# Patient Record
(demographics unavailable — no encounter records)

---

## 2025-02-24 NOTE — HISTORY OF PRESENT ILLNESS
[Never] : never [TextBox_4] :    64 M who came to the ED for slurred speech. History obtained via patient's wife. H/O gerd and hld  Patient saw ENT a few days ago for difficulty swallowing and was prescribed omeprazole.

## 2025-03-04 NOTE — HISTORY OF PRESENT ILLNESS
[de-identified] :  JAYLEN GANN is a 64 year old man who presents to the [facility] with difficulty swallowing for the past ~  ~.   He is referred by ~  ~. They ~  ~ note altering their diet to avoid troublesome consistencies. They ~  ~ note regurgitation of recently eaten foods. They ~  ~ note recent weight loss. They ~  ~ note frequent classic reflux symptoms.

## 2025-03-05 NOTE — ADDENDUM
[FreeTextEntry1] :  Documented by Amaris Heart acting as scribe for Dr. Goode on 03/05/2025. All Medical record entries made by the Scribe were at my, Dr. Goode, direction and personally dictated by me on 03/05/2025 . I have reviewed the chart and agree that the record accurately reflects my personal performance of the history, physical exam, assessment and plan. I have also personally directed, reviewed, and agreed with the discharge instructions.

## 2025-03-05 NOTE — PHYSICAL EXAM
[Midline] : trachea located in midline position [Normal] : no rashes [de-identified] : thrush observed on the tongue [de-identified] :  uvula and palate rise in the middle.  [de-identified] :  uvula and palate rise in the middle.

## 2025-03-05 NOTE — HISTORY OF PRESENT ILLNESS
[de-identified] : 64yM with dysphagia for about a month Previously saw ENT, has right DS, prescribed Omeprazole 40 mg, some improvement with after starting omeprazole.  On Pepcid 10 mg at bedtime.   No nasal spray Wife describes voice change with hyponasality change that suddenly comes and goes Choking with food, occasionally with water.  Saw gastro for dysphagia Scheduled for swallow eval with kai 3/6/24.  Has a swallow test scheduled for Friday.  Was recently at [facility] for slurred speech, CT of the head was done to r/o stroke, scan is normal, ED recommented to see neuro.  Now dysphonic, worse at the end of the day. Pt describes voice becomes increasingly nasal through the day. Denies SOB, hemoptysis Globus sensation, phlegm   CT neck done 2/28/25. Impression: Mild asymmetric enlargement of the right laryngeal ventricle and piriform sinus suggestive of right vocal fold dysfunction. Correlate with direct visualization.  Nonspecific soft tissue nodule within the right tracheoesophageal groove which may represent a paratracheal lymph node versus exophytic thyroid nodule versus parathyroid adenoma.  No nodular or masslike enhancement in the aerodigestive tract.  Some nasal congestion, No epistaxis  Patient denies otalgia, otorrhea, ear infections, tinnitus, dizziness, vertigo, headaches related to hearing.  Wife state hearing loss.  Recently prescribed Plavix

## 2025-03-06 NOTE — ASSESSMENT
[FreeTextEntry1] : CLINICAL DYSPHAGIA EVALUATION   Date of Report: 03/06/2025  Date of Evaluation:  03/06/2025  Patient Name: Osmani Chicas   YOB: 1960  Primary Diagnosis:  Dysphagia, Dysarthria  Treatment Diagnosis:  Dysphagia, Dysarthria, Dysphonia   Referring Physician:   Dr. Hernandez   REASON FOR REFERRAL: Osmani Chicas is an 64-year-old male who was referred for a clinical swallow evaluation by his pulmonologist Dr. Hernandez due to concerns for dysphagia.    PRESENTING PROBLEM. Mr. Chicas arrived to the evaluation accompanied by his spouse, Madisyn, and was received alert, cooperative, and in no apparent distress.  Per chart review and patient/spouse report, the patient presented with new onset of slurred speech and concerns for dysphagia and was hospitalized at [facility] February 2025 with findings of TIA. The patient is now following with pulmonology, neurology, and otolaryngology due to ongoing concerns for speech and swallowing. The patient is planned for ongoing neurological work up at this time. The patient was most recently seen by otolaryngologist Dr. Goode on 3/5/25 with findings of a R VF paresis, thrush, and recommendations for a Modified Barium Swallow Study (not yet scheduled) and further work up.  The patient and patient's spouse reports speech difficulty marked by reduced speech intelligibility, low vocal volume, and a "nasal" quality. The patient reports swallowing difficulty marked by intermittent feelings of stasis with solids and liquids (greater with dry/denser solids) with subsequent coughing and volitional regurgitation with noted nasal emission. The patient further reports concerns for mucous build up and "throat irritation" resulting in throat clearing/coughing. The patient denies needing the Heimlich maneuver. The patient denies odynophagia, feelings of shortness of breath during or after meals, and any recent pneumonia. The patient endorses a 10 pound weight loss in the last month.       Medical History, per EMR:    Active Problems Chronic cough (786.2) (R05.3) Oropharyngeal dysphagia (787.22) (R13.12) Rhinolalia (784.49) (R49.8)       CLINICAL FINDINGS    Oral Peripheral Assessment:   Structures: WFL    Symmetry:   WFL                    Dentition:  Complete     Soft Palate:   WFL    Secretions:  The patient managed his secretions without difficulty and his oral cavity was adequately hydrated.   Cognition/Communication:  The patient was oriented x3 and demonstrated ability to follow simple commands and verbally express his wants/needs at the conversational level without word retrieval deficits. The patient presented with a mild dysarthria of speech marked by slower rate, reduced vocal volume, and mildly reduced articulatory precision at the conversational level.    Voice: Perceptually, the patient's voice presented as mildly hoarse, with hypernasality with low vocal volume.    Functions: Assessment of labial-lingual and mandibular musculature revealed adequate labial, lingual, and mandibular ROM and strength.    Volitional Swallow: Upon clinician verbal instruction, the patient demonstrated timely pharyngeal swallow trigger and complete hyolaryngeal elevation.     Consistencies Administered:     Solids: Soft and bite sized, minced and moist and pureed    Liquids: Moderately thick, Mildly thick and thin liquids     Oral Stage:     The patient demonstrated a functional oral phase for pureed, solids, moderately thick, mildly thick, and thin liquids marked by adequate oral acceptance and timely collection and transport. Piecemeal deglutition observed throughout trials.    Pharyngeal Stage:   The patient demonstrated a pharyngeal dysphagia with pureed, solids, moderately thick, mildly thick and thin liquids characterized by suspected timely pharyngeal swallow trigger and laryngeal elevation/excursion noted by digital palpation with multiple swallows and patient with reports of stasis (greater with soft and bite sized solids vs. Minced and moist, pureed, and liquids). Patient presented with volitional throat clearing across trials reporting feelings of stasis and "irritation" in throat. Patient was cued to perform subsequent swallows and to alternate solids with liquids which reduced feelings of stasis and throat clearing. Coughing was observed with thin liquids despite use of strategies.    IMPRESSIONS:   1. Pharyngeal dysphagia  2. Mild Dysarthria   3. Dysphonia    PROGNOSIS: Fair-Good with therapeutic intervention and adherence to HEP   RECOMMENDATIONS:  1. The patient was advised to consume a diet level of minced and moist solids and mildly thick liquids, perform two swallows per bolus, and alternate solids with liquids.  2.  The patient was advised to consume small bites/sips, perform two swallows, and alternate solids with liquids at a decreased rate of consumption.   3. The patient was advised to position himself upright (no <90 degrees) during all meals and for at least 1 hour after while maintaining reflux precautions.   4. An objective swallow study of a Modified Barium Swallow Study (MBSS) is recommended to further assess swallow mechanism and safest diet level.   5. Initiate dysphagia therapy (CPT code 08239) and speech/voice therapy (CPT code 14960) to maximize swallow function and overall speech intelligibility/vocal function.    6. Follow up with referring/following physicians, as directed   EDUCATION:  All evaluation results and recommendations were discussed with patient and his spouse. Verbal educational information and instruction was provided to the patient and patient's family in regards to optimal eating strategies (i.e. small bites/sips and alternating solids with liquids at decreased rate of consumption) at the end of the evaluation session.         THERAPY GOALS:  1.  The patient will reduce vocal tension/strain by decreasing upper musculoskeletal tension via relaxation exercises and laryngeal massage technique with 100% accuracy independently.  2. The patient will apply learned techniques (i.e. easy onset, frontal focus) yielding healthy vocal cord adduction and improved vocal quality in structured tasks with 80% accuracy with minimal verbal cues.  3. The patient will engage in laryngeal adduction exercises to target vocal fold closure and mobility with 90% accuracy given min cues.      4. The patient will utilize dysarthria compensatory strategies such as increased vocal volume and over articulation with 80% success given minimal cues to improve overall vocal output.  5. The patient will engage in therapeutic swallowing strengthening exercises (i.e. effortful swallow) to maximize swallow function        Please contact this Center at [phone] if additional information is needed.    Gabriela Wooten M.A. CCC-SLP  Speech-Language Pathologist   [facility] [place]

## 2025-03-13 NOTE — DISCUSSION/SUMMARY
[FreeTextEntry1] : 65 yo man with 1 month of dysphagia and dysarthria. CT/CTA in ED had been normal, plavix and ASA was recommended. Dysarthria may be worse in evening. Remainder of exam unremarkable.   Will check MG labs, and MRI brain, MRI neck per ENT  Further workup pending those studies.

## 2025-03-13 NOTE — DISCUSSION/SUMMARY
[FreeTextEntry1] : 63 yo man with 1 month of dysphagia and dysarthria. CT/CTA in ED had been normal, plavix and ASA was recommended. Dysarthria may be worse in evening. Remainder of exam unremarkable.   Will check MG labs, and MRI brain, MRI neck per ENT  Further workup pending those studies.

## 2025-03-13 NOTE — PHYSICAL EXAM
[Person] : oriented to person [Place] : oriented to place [Time] : oriented to time [Short Term Intact] : short term memory intact [Registration Intact] : recent registration memory intact [Naming Objects] : no difficulty naming common objects [Fluency] : fluency intact [Cranial Nerves Optic (II)] : visual acuity intact bilaterally,  visual fields full to confrontation, pupils equal round and reactive to light [Cranial Nerves Oculomotor (III)] : extraocular motion intact [Cranial Nerves Trigeminal (V)] : facial sensation intact symmetrically [Cranial Nerves Facial (VII)] : face symmetrical [Cranial Nerves Vestibulocochlear (VIII)] : hearing was intact bilaterally [Cranial Nerves Glossopharyngeal (IX)] : tongue and palate midline [Cranial Nerves Accessory (XI - Cranial And Spinal)] : head turning and shoulder shrug symmetric [Cranial Nerves Hypoglossal (XII)] : there was no tongue deviation with protrusion [Motor Tone] : muscle tone was normal in all four extremities [Motor Strength] : muscle strength was normal in all four extremities [Motor Handedness Right-Handed] : the patient is right hand dominant [Sensation Tactile Decrease] : light touch was intact [Sensation Vibration Decrease] : vibration was intact [2+] : Biceps left 2+ [1+] : Patella left 1+ [de-identified] : thrush observed on the tongue [de-identified] :  uvula and palate rise in the middle.  [Dysdiadochokinesia Bilaterally] : not present [Coordination - Dysmetria Impaired Finger-to-Nose Bilateral] : not present [FreeTextEntry6] : Neck flexion/extension 5/5 [FreeTextEntry1] : Speech mildly dysarthric, mostly lingual. EOMI, no SWJ, sustained upgaze ok. Tone normal, no bradykinesia. Gait normal, romberg negative.

## 2025-03-13 NOTE — PHYSICAL EXAM
[Person] : oriented to person [Place] : oriented to place [Time] : oriented to time [Short Term Intact] : short term memory intact [Registration Intact] : recent registration memory intact [Naming Objects] : no difficulty naming common objects [Fluency] : fluency intact [Cranial Nerves Optic (II)] : visual acuity intact bilaterally,  visual fields full to confrontation, pupils equal round and reactive to light [Cranial Nerves Oculomotor (III)] : extraocular motion intact [Cranial Nerves Trigeminal (V)] : facial sensation intact symmetrically [Cranial Nerves Facial (VII)] : face symmetrical [Cranial Nerves Vestibulocochlear (VIII)] : hearing was intact bilaterally [Cranial Nerves Glossopharyngeal (IX)] : tongue and palate midline [Cranial Nerves Accessory (XI - Cranial And Spinal)] : head turning and shoulder shrug symmetric [Cranial Nerves Hypoglossal (XII)] : there was no tongue deviation with protrusion [Motor Tone] : muscle tone was normal in all four extremities [Motor Strength] : muscle strength was normal in all four extremities [Motor Handedness Right-Handed] : the patient is right hand dominant [Sensation Tactile Decrease] : light touch was intact [Sensation Vibration Decrease] : vibration was intact [2+] : Biceps left 2+ [1+] : Patella left 1+ [de-identified] : thrush observed on the tongue [de-identified] :  uvula and palate rise in the middle.  [Dysdiadochokinesia Bilaterally] : not present [Coordination - Dysmetria Impaired Finger-to-Nose Bilateral] : not present [FreeTextEntry6] : Neck flexion/extension 5/5 [FreeTextEntry1] : Speech mildly dysarthric, mostly lingual. EOMI, no SWJ, sustained upgaze ok. Tone normal, no bradykinesia. Gait normal, romberg negative.

## 2025-03-13 NOTE — HISTORY OF PRESENT ILLNESS
[FreeTextEntry1] : The patient is a 63 yo RHM with dysphagia (gets stuck in esophagus) and slurred speech. Sudden onset in February 2025. Went to ED after episode of being unable to speak, CT/A head normal. Saw ENT, who considered MRI brain and neck, but has not ordered. Omeprazole 40 mg, some improvement with after starting omeprazole. On Pepcid 10 mg at bedtime. Scheduled for swallow eval with kai 3/6/24. Now dysphonic, worse at the end of the day. Patient's wife describes voice becomes increasingly nasal through the day.  No diplopia in the evening. No other weakness, numbness of lip initially. Handwriting unchanged. ADLs ok. Balance and walking ok.

## 2025-03-25 NOTE — PHYSICAL EXAM
[Person] : oriented to person [Place] : oriented to place [Time] : oriented to time [Fluency] : fluency intact [Cranial Nerves Optic (II)] : visual acuity intact bilaterally,  visual fields full to confrontation, pupils equal round and reactive to light [Cranial Nerves Oculomotor (III)] : extraocular motion intact [Cranial Nerves Trigeminal (V)] : facial sensation intact symmetrically [Cranial Nerves Vestibulocochlear (VIII)] : hearing was intact bilaterally [Cranial Nerves Glossopharyngeal (IX)] : tongue and palate midline [Cranial Nerves Accessory (XI - Cranial And Spinal)] : head turning and shoulder shrug symmetric [Cranial Nerves Hypoglossal (XII)] : there was no tongue deviation with protrusion [Motor Tone] : muscle tone was normal in all four extremities [No Muscle Atrophy] : normal bulk in all four extremities [+4] : hip flexion +4/5 [5] : ankle plantar flexion 5/5 [Sensation Tactile Decrease] : light touch was intact [Abnormal Walk] : normal gait [1+] : Ankle jerk left 1+

## 2025-03-25 NOTE — HISTORY OF PRESENT ILLNESS
[FreeTextEntry1] : This is a 64M who comes in with AChR positive generalized MG.  In February, the patients started getting tongue weakness/slurred speech. His wife worried about a stroke, brought him to ED, and had negative stroke workup but started on Plavix empirically. They followed up with ENT (Dr. 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 They were recommended to start PT, MRI Brain/neck, and neurology follow up. They then saw Dr. Sesay who ordered MG labs and positive AChR binding/blocking/modulating as well as anti-Titin. CT Chest negative for thymoma.  He continues to have worsening dysphagia, dysarthria, generalized weakness, and fatigue. Denies diplopia or blurry vision. His wife has been blending his food as needs modified diet, thought he is still able to take pills. He has lost over 15 lbs in over two months. Having weakness throughout - chewing is difficult, weakness throughout. At baseline was walking 7 miles daily, now barely walks. Fatigues very quickly. Morning he tends to be better, sounds better, eats better - all worsens as the day progresses.

## 2025-03-25 NOTE — ASSESSMENT
[FreeTextEntry1] : This is a 64M with generalized AChR positive, generalized MG with significant bulbar symptoms. Discussed condition, goals of treatment, and prognosis at length.  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

## 2025-03-28 NOTE — ASSESSMENT
[FreeTextEntry1] : MODIFIED BARIUM SWALLOW STUDY   Date of Report: 3/28/25  Date of Evaluation: 3/28/25  Patient Name: Osmani Chicas  YOB: 1960  Primary Diagnosis: OroPharyngeal Dysphagia  Treatment Diagnosis:  OroPharyngeal Dysphagia  Referring Physician: Dr. Goode   Impressions/Results: No penetration or aspiration before, during, or after the swallow on puree, regular, mildly thick or thin liquids. 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   Reason For Referral: This 64 year old male was seen for a Modified Barium Swallow to objectively assess swallow function, rule out aspiration, and assess for safest diet consistency. Patient confirms medical history per EMR, states a work-up is in progress for Mysathenia Gravis however cannot recall any definitive diagnosis or results from recent esophagram. Patient reports recent history of progressive dysarthria and dysphagia as well as hyponasal voice which was judged to be mild during today's visit. Patient with fluent speech in English and declined need for Kyrgyz translation and completed interview and exam in English. Patient reports up until 3 days ago, he was "having trouble swallowing and relied more on liquids." Patient states "no it is much much, much better." Patient notes recently starting "few medications to help with swallowing" however cannot recall names of medication except for prednisone. He reports "30 minutes after taking the pill, everything is much better."   Current Diet: Regular & Thin per patient report    Medical History: Patient recently seen by Neurologist: "Assessment  Myasthenia (358.00) (G70.00) This is a 64M with generalized AChR positive, generalized MG with significant bulbar symptoms. Discussed condition, goals of treatment, and prognosis at length.  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  The patient was alert, cooperative.  Secretion management was adequate.  There was no coughing, throat clearing or wet/gurgly vocal quality prior to test administration.   Consistencies Administered:    Solids:  Puree, Regular  Liquids:  Mildly Thick, Thin   Summary & Impression  1. Functional oral phase for puree and regular characterized by adequate bolus retrieval/containment, adequate mastication of regular, adequate anterior posterior transfer, piecemeal deglutition, and oral clearance.  2. Mild oral dysphagia for mildly thick and thin liquids marked by adequate retrieval, reduced containment with premature spillage to the hypopharynx due to reduced tongue to palate seal, piecemeal deglutition, and adequate oral clearance. 3. Mild pharyngeal dysphagia marked by prompt swallow initiation, reduced laryngeal elevation, adequate base of tongue retraction, adequate epiglottic deflection, complete laryngeal vestibule closure, and adequate pharyngeal constriction. There were mild pharyngeal clearance deficits for puree and regular solid located at the vallecula/pyriform sinuses which cleared upon spontaneous swallows and use of a liquid wash. There was trace residue located at the vallecula/pyriform sinuses for mildly thick and thin liquids which cleared upon spontaneous reswallow. No penetration or aspiration before, during, or after the swallow on puree, regular, mildly thick or thin liquids. 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   An Esophageal Screen was completed. The patient was turned to AP view and given a puree bolus, a thin bolus, and a barium tablet with water which all coursed through the hypopharynx/esophagus. Of note; this is not a completed assessment of the esophagus.    Aspiration - Penetration Scale:   PUREE: 1  SOLIDS: 1  MODERATELY THICK LIQUIDS: N/A  MILDLY THICK LIQUIDS: 1  THIN LIQUIDS: 1   Aspiration - Penetration Scale  (Vince et al Dysphagia 11:93-98 (April 1996), Aspiration-Penetration Scale)  1.    Material does not enter the airway  2.    Material enters the airway, remains above the vocal folds, and is ejected from the airway  3.    Material enters the airway, remains above the vocal folds, and is not ejected  4.    Material enters the airway, contacts the vocal folds, and is ejected from the airway  5.    Material enters the airway, contacts the vocal folds, and is not ejected from the airway  6.    Material enters the airway, passes below the vocal folds and is ejected into the larynx or out of the airway  7.    Material enters the airway, passes below the vocal folds, and is not ejected from the trachea despite effort  8.    Material enters the airway, passes below the vocal folds, and no effort is made to eject   Recommendations:  1.) Regular / Thin Liquids  2.) Alternate Solids and Liquids  3.) Aspiration/Reflux Precautions  4.) Follow up with referring physician  5.) Continue swallowing therapy to improve swallow mechanism   The above results and recommendations have been discussed with the patient. All questions were answered with patient demonstrating good understanding.    Should you have any additional concerns, please contact the Center at [phone].   Yary Roy MS, CCC-SLP  Speech-Language Pathologist   [facility]

## 2025-04-11 NOTE — HISTORY OF PRESENT ILLNESS
[FreeTextEntry1] : Reports that he has been progressively feeling better. He was waking up with significant dysphagia, dysphonia, and fatigue but over the last couple of days this has improved, though he continues to have some morning symptoms. Has noticed the pyridostigmine takes about an hour to kick in and wears off by 1-2 hours. Not sure if it's giving him much benefit, especially more recently as his baseline symptoms are improving.    Having urinary frequency since starting prednisone.  Patient's wife and son with questions regarding supplements.  Has not pursued vaccination yet.  Initial HPI This is a 64M who comes in with AChR positive generalized MG.  In February, the patients started getting tongue weakness/slurred speech. His wife worried about a stroke, brought him to ED, and had negative stroke workup but started on Plavix empirically. They followed up with ENT (Dr. 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 They were recommended to start PT, MRI Brain/neck, and neurology follow up. They then saw Dr. Sesay who ordered MG labs and positive AChR binding/blocking/modulating as well as anti-Titin. CT Chest negative for thymoma.  He continues to have worsening dysphagia, dysarthria, generalized weakness, and fatigue. Denies diplopia or blurry vision. His wife has been blending his food as needs modified diet, thought he is still able to take pills. He has lost over 15 lbs in over two months. Having weakness throughout - chewing is difficult, weakness throughout. At baseline was walking 7 miles daily, now barely walks. Fatigues very quickly. Morning he tends to be better, sounds better, eats better - all worsens as the day progresses.

## 2025-04-11 NOTE — ASSESSMENT
[FreeTextEntry1] : This is a 64M with generalized AChR positive, generalized MG with significant bulbar symptoms. He has had good response to steroids, mycophenolate, and pyridostigmine. Feels that pyridostigmine is not as effective and he is having increased urinary frequency on steroids.  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

## 2025-05-02 NOTE — PHYSICAL EXAM
[Midline] : trachea located in midline position [Normal] : no rashes [de-identified] : thrush observed on the tongue [de-identified] :  uvula and palate rise in the middle.  [de-identified] :  uvula and palate rise in the middle.

## 2025-05-02 NOTE — REASON FOR VISIT
[Subsequent Evaluation] : a subsequent evaluation for [Spouse] : spouse [FreeTextEntry2] : difficulty swallowing

## 2025-05-02 NOTE — HISTORY OF PRESENT ILLNESS
[de-identified] : 64yM with dysphagia for about a month Previously saw ENT, has right DS, prescribed Omeprazole 40 mg, some improvement with after starting omeprazole.  On Pepcid 10 mg at bedtime.   No nasal spray Wife describes voice change with hyponasality change that suddenly comes and goes Choking with food, occasionally with water.  Saw gastro for dysphagia Was recently at [facility] for slurred speech, CT of the head was done to r/o stroke, scan is normal, ED recommented to see neuro.  Now dysphonic, worse at the end of the day. Pt describes voice becomes increasingly nasal through the day. Denies SOB, hemoptysis Here for follow up. Doing well, improvement in dysphagia and dysphonia.  MBS 3/28/25 showing no aspiration. Working with swallow and voice therapy with huge improvement Saw neuro who decreased his prednisone to 15mg No longer taking famotidine. Finished course of nystatin  CT neck done 2/28/25. Impression: Mild asymmetric enlargement of the right laryngeal ventricle and piriform sinus suggestive of right vocal fold dysfunction. Correlate with direct visualization.  Nonspecific soft tissue nodule within the right tracheoesophageal groove which may represent a paratracheal lymph node versus exophytic thyroid nodule versus parathyroid adenoma.  No nodular or masslike enhancement in the aerodigestive tract.  Some nasal congestion, No epistaxis  Patient denies otalgia, otorrhea, ear infections, tinnitus, dizziness, vertigo, headaches related to hearing.  Wife state hearing loss.  Recently prescribed Plavix

## 2025-05-15 NOTE — PHYSICAL EXAM
[Person] : oriented to person [Place] : oriented to place [Time] : oriented to time [Fluency] : fluency intact [Cranial Nerves Optic (II)] : visual acuity intact bilaterally,  visual fields full to confrontation, pupils equal round and reactive to light [Cranial Nerves Oculomotor (III)] : extraocular motion intact [Cranial Nerves Trigeminal (V)] : facial sensation intact symmetrically [Cranial Nerves Facial (VII)] : face symmetrical [Cranial Nerves Vestibulocochlear (VIII)] : hearing was intact bilaterally [Cranial Nerves Glossopharyngeal (IX)] : tongue and palate midline [Cranial Nerves Accessory (XI - Cranial And Spinal)] : head turning and shoulder shrug symmetric [Cranial Nerves Hypoglossal (XII)] : there was no tongue deviation with protrusion [Motor Tone] : muscle tone was normal in all four extremities [Motor Strength] : muscle strength was normal in all four extremities [No Muscle Atrophy] : normal bulk in all four extremities [Sensation Tactile Decrease] : light touch was intact [Abnormal Walk] : normal gait [1+] : Ankle jerk left 1+ [FreeTextEntry6] : No fatigability on exam

## 2025-05-15 NOTE — ASSESSMENT
[FreeTextEntry1] : This is a 64M with AChR positive, generalized MG with significant bulbar symptoms. He has had good response to steroids, mycophenolate, and pyridostigmine.   - C/w pyridostigmine 60mg TID - C/w prednisone 15mg daily - C/w mycophenolate 500mg BID - Answered all questions  F/u 3 months, will discuss possible titration down on steroids then

## 2025-05-15 NOTE — HISTORY OF PRESENT ILLNESS
[FreeTextEntry1] : Continues to feel better. Down to prednisone 15mg daily around 2 weeks ago. Still has some fatigue/tiredness but is walking up to 6 miles with minimal issues. Speech, chewing, and swallowing are normal. Running around all day doing renovations at home and feeling some tiredness at the end of the day but nothing unusual.  Only needs pyridostigmine TID and not waking up with symptoms so not taking ER.  4/10/25 Reports that he has been progressively feeling better. He was waking up with significant dysphagia, dysphonia, and fatigue but over the last couple of days this has improved, though he continues to have some morning symptoms. Has noticed the pyridostigmine takes about an hour to kick in and wears off by 1-2 hours. Not sure if it's giving him much benefit, especially more recently as his baseline symptoms are improving.  Having urinary frequency since starting prednisone.  Patient's wife and son with questions regarding supplements.  Has not pursued vaccination yet.  Initial HPI This is a 64M who comes in with AChR positive generalized MG.  In February, the patients started getting tongue weakness/slurred speech. His wife worried about a stroke, brought him to ED, and had negative stroke workup but started on Plavix empirically. They followed up with ENT (Dr. 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 They were recommended to start PT, MRI Brain/neck, and neurology follow up. They then saw Dr. Sesay who ordered MG labs and positive AChR binding/blocking/modulating as well as anti-Titin. CT Chest negative for thymoma.  He continues to have worsening dysphagia, dysarthria, generalized weakness, and fatigue. Denies diplopia or blurry vision. His wife has been blending his food as needs modified diet, thought he is still able to take pills. He has lost over 15 lbs in over two months. Having weakness throughout - chewing is difficult, weakness throughout. At baseline was walking 7 miles daily, now barely walks. Fatigues very quickly. Morning he tends to be better, sounds better, eats better - all worsens as the day progresses.

## 2025-06-18 NOTE — HISTORY OF PRESENT ILLNESS
[FreeTextEntry1] : Feeling well, no major changes. No new complaints. No taking pyridostigmine ER as he has stable AM symptoms.  Planning on going to [place] for vacation later this month, asking if there are any concerns he should be aware of.  5/13/25 Continues to feel better. Down to prednisone 15mg daily around 2 weeks ago. Still has some fatigue/tiredness but is walking up to 6 miles with minimal issues. Speech, chewing, and swallowing are normal. Running around all day doing renovations at home and feeling some tiredness at the end of the day but nothing unusual.  Only needs pyridostigmine TID and not waking up with symptoms so not taking ER.  4/10/25 Reports that he has been progressively feeling better. He was waking up with significant dysphagia, dysphonia, and fatigue but over the last couple of days this has improved, though he continues to have some morning symptoms. Has noticed the pyridostigmine takes about an hour to kick in and wears off by 1-2 hours. Not sure if it's giving him much benefit, especially more recently as his baseline symptoms are improving.  Having urinary frequency since starting prednisone.  Patient's wife and son with questions regarding supplements.  Has not pursued vaccination yet.  Initial HPI This is a 64M who comes in with AChR positive generalized MG.  In February, the patients started getting tongue weakness/slurred speech. His wife worried about a stroke, brought him to ED, and had negative stroke workup but started on Plavix empirically. They followed up with ENT (Dr. 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 They were recommended to start PT, MRI Brain/neck, and neurology follow up. They then saw Dr. Sesay who ordered MG labs and positive AChR binding/blocking/modulating as well as anti-Titin. CT Chest negative for thymoma.  He continues to have worsening dysphagia, dysarthria, generalized weakness, and fatigue. Denies diplopia or blurry vision. His wife has been blending his food as needs modified diet, thought he is still able to take pills. He has lost over 15 lbs in over two months. Having weakness throughout - chewing is difficult, weakness throughout. At baseline was walking 7 miles daily, now barely walks. Fatigues very quickly. Morning he tends to be better, sounds better, eats better - all worsens as the day progresses.

## 2025-06-18 NOTE — ASSESSMENT
[FreeTextEntry1] : This is a 64M with AChR positive, generalized MG with significant bulbar symptoms. He has had good response to steroids, mycophenolate, and pyridostigmine.  Discussed that while abroad he should bring extra pyridostigmine in case he needs to take extra doses with the heat and acitivity.  - C/w pyridostigmine 60mg TID - C/w prednisone 15mg daily - C/w mycophenolate 500mg BID - Answered all questions  Return in 2 months, can try and taper down prednisone at that time